# Patient Record
Sex: MALE | Race: WHITE | NOT HISPANIC OR LATINO | URBAN - METROPOLITAN AREA
[De-identification: names, ages, dates, MRNs, and addresses within clinical notes are randomized per-mention and may not be internally consistent; named-entity substitution may affect disease eponyms.]

---

## 2022-05-12 ENCOUNTER — EMERGENCY (EMERGENCY)
Facility: HOSPITAL | Age: 27
LOS: 1 days | Discharge: HOME | End: 2022-05-12
Attending: STUDENT IN AN ORGANIZED HEALTH CARE EDUCATION/TRAINING PROGRAM
Payer: COMMERCIAL

## 2022-05-12 VITALS
HEART RATE: 65 BPM | DIASTOLIC BLOOD PRESSURE: 83 MMHG | RESPIRATION RATE: 18 BRPM | SYSTOLIC BLOOD PRESSURE: 125 MMHG | WEIGHT: 154.98 LBS | TEMPERATURE: 98 F

## 2022-05-12 VITALS — OXYGEN SATURATION: 99 %

## 2022-05-12 PROCEDURE — 99284 EMERGENCY DEPT VISIT MOD MDM: CPT

## 2022-05-12 PROCEDURE — 99283 EMERGENCY DEPT VISIT LOW MDM: CPT

## 2022-05-12 PROCEDURE — 82962 GLUCOSE BLOOD TEST: CPT

## 2022-05-12 NOTE — ED PROVIDER NOTE - NSFOLLOWUPINSTRUCTIONS_ED_ALL_ED_FT
the source has to be checked on the HIV test \  please call and follow up with Employee health in 3 days   Needlestick and Sharps Injury      A needlestick injury happens when you get poked by a needle or sharp tool (sharps) that may have someone else's blood on it. You may get that person's blood or body fluids on you. Blood can carry germs that cause infection, such as:  •Hepatitis B germs.      •Hepatitis C germs.      •HIV (human immunodeficiency virus).        What are the causes?    This injury is caused by a needle or other sharp tool that breaks through or cuts your skin. The injury can happen to people who do these things:  •Give someone a shot (injection).      •Take a blood sample from someone.      •Do medical procedures with a needle or sharp knife used for surgery (scalpel).      •Handle or throw away used needles.      •Clean equipment or patient care areas.      •Touch a needle that was thrown out.      •Share needles or use them to give themselves drugs.        What increases the risk?    •Putting caps back on needles.      •Passing sharp objects to someone else.      •Not using safety tools with needles.      •Not having safety tools to use with needles.      •Feeling like you have to move or work fast when using needles or sharps at work.        What are the signs or symptoms?    •Pain or irritation at or near the wound.      •Bleeding at or near the wound.        How is this treated?  A person having a blood sample taken from arm.   Take these steps if you get poked by a needle or something sharp, or if you think you got blood or body fluids on you:  •Wash your wound right away with soap and water. Wash for at least 20 seconds.      •Place a bandage (dressing) or clean towel on your wound and put gentle pressure on it.      •Do not squeeze or rub your wound.    •Tell a workplace supervisor or doctor right away. Then:  •Follow any procedures in your workplace, if they apply.      •Get treatment right away, if you need it.        •Keep your shots (vaccines) up to date, if you are an adult.      Treatment may include:  •A tetanus booster shot.      •A hepatitis B shot.      •Blood tests.      •Medicines.      •Wound care.        Follow these instructions at home:    Wound care   •Follow instructions from your doctor about how to take care of your wound. Make sure you:  •Wash your hands with soap and water for at least 20 seconds before and after you change your bandage, if you have one. If you cannot use soap and water, use hand .      •Change your bandage.      •Leavestitches or skin glue in place for at least 2 weeks.      •Leave tape strips alone unless you are told to take them off. You may trim the edges of the tape strips if they curl up.        •Keep the bandage dry as told by your doctor.       • Do not take baths, swim, or use a hot tub. Do not do anything that would put your wound underwater until told.    •Check your wound every day for signs of infection. Check for:  •Redness, swelling, or pain.      •Fluid or blood.      •Warmth.      •Pus or a bad smell.        General instructions     •Take over-the-counter and prescription medicines only as told by your doctor.      •If you were prescribed an antibiotic medicine, take it as told by your doctor. Do not stop using the antibiotic even if you start to feel better.      •Keep all follow-up visits.        Contact a doctor if:    •The poked area is red, swollen, bleeding, or painful.      •You have a fever, or you feel tired.    •You feel any of these:  •Worried or nervous (anxious).      •Mad.       •Sad (depressed).        •You have trouble sleeping.      •You feel sickness in a lot of your body (malaise).      •You get infections often.      •Your skin or the white parts of your eyes turn yellow (jaundice).      •You have belly pain or a feeling of fullness.        Summary    •A needlestick injury happens when a person gets poked by a sharp tool that may have someone else's blood on it.      •This injury is treated by washing the injured area right away with soap and water for at least 20 seconds.       •You may get shots for tetanus and hepatitis B. You may also get medicines.      This information is not intended to replace advice given to you by your health care provider. Make sure you discuss any questions you have with your health care provider.

## 2022-05-12 NOTE — ED PROVIDER NOTE - PHYSICAL EXAMINATION
Const: AOX3 nontoxic appearing, no apparent respiratory or physical distress. Stable gait   HEENT: NC/AT. Moist mucous membranes.  Eyes: MARLENY. EOMI  Neck: Soft and supple. Full ROM without pain.  Resp: Speaking in full sentences. No evidence of respiratory distress.   Skin: left 3rd MCP puncture wound noted not TTP ROM grossly intact , no erythema or edema . no bleeding   Neuro: Awake, alert & oriented x 3. Moves all extremities symmetrically.

## 2022-05-12 NOTE — ED ADULT TRIAGE NOTE - CHIEF COMPLAINT QUOTE
pt c/o needle stick during OR rotation on L thrid knuckle., the needle was dirty and pt is unaware of surgical patients pmhx.

## 2022-05-12 NOTE — ED PROVIDER NOTE - ATTENDING APP SHARED VISIT CONTRIBUTION OF CARE
I personally saw the patient with the PA, and completed the key components of the history and physical exam. I then discussed the management plan with the PA. 27 yo male with tiny superficial left hand wound secondary to being stuck by a scalpel blade during surgical procedure. PrEP was offered and patient declines. Source patient to be tested. I personally saw the patient with the PA, and completed the key components of the history and physical exam. I then discussed the management plan with the PA.

## 2022-05-12 NOTE — ED PROVIDER NOTE - PATIENT PORTAL LINK FT
You can access the FollowMyHealth Patient Portal offered by [facility] by registering at the following website: [email or web address]. By joining [facility]’s FollowMyHealth portal, you will also be able to view your health information using other applications (apps) compatible with our system.

## 2022-05-12 NOTE — ED PROVIDER NOTE - CONSTITUTIONAL NEGATIVE STATEMENT, MLM
[Palliative] : Goals of care discussed with patient: Palliative [Palliative Care Plan] : not applicable at this time no fever and no chills.

## 2022-05-12 NOTE — ED PROVIDER NOTE - CLINICAL SUMMARY MEDICAL DECISION MAKING FREE TEXT BOX
blood drawn . pt dose not at the PEP at this point   he is contact the supervisor to get source HIV test    f.u 3 days in employee health

## 2022-05-12 NOTE — ED PROVIDER NOTE - OBJECTIVE STATEMENT
26 y,o male No PMH work resident at the [employer] in [place] his right middle knuckle in got stuck with Dirty scalpel about an hour PTA. was bleeding  he had double gloves he wash it right away with soap . states he is update with tetanus and hep vaccine.